# Patient Record
Sex: FEMALE | Race: WHITE | NOT HISPANIC OR LATINO | Employment: OTHER | URBAN - METROPOLITAN AREA
[De-identification: names, ages, dates, MRNs, and addresses within clinical notes are randomized per-mention and may not be internally consistent; named-entity substitution may affect disease eponyms.]

---

## 2019-10-23 ENCOUNTER — TELEPHONE (OUTPATIENT)
Dept: PRIMARY CARE CLINIC | Facility: CLINIC | Age: 69
End: 2019-10-23

## 2019-10-23 DIAGNOSIS — M65.30 TRIGGER FINGER, UNSPECIFIED FINGER, UNSPECIFIED LATERALITY: Primary | ICD-10-CM

## 2019-10-23 NOTE — TELEPHONE ENCOUNTER
"----- Message from Yasmeen Reed sent at 10/23/2019  2:58 PM CDT -----  Contact: Patient   Patient said she has been having trouble with her hand for the past two months. She thinks she has a "trigger" finger. She wants to get a referral and wanted to know if she needs an appt to get one.  "

## 2019-10-24 NOTE — TELEPHONE ENCOUNTER
She does not need an apt to get  the referral but she does need an apt at some point since we have not seen her in over 1 year.

## 2020-07-22 ENCOUNTER — TELEPHONE (OUTPATIENT)
Dept: OBSTETRICS AND GYNECOLOGY | Facility: CLINIC | Age: 70
End: 2020-07-22

## 2020-07-22 ENCOUNTER — PATIENT MESSAGE (OUTPATIENT)
Dept: OBSTETRICS AND GYNECOLOGY | Facility: CLINIC | Age: 70
End: 2020-07-22

## 2020-07-22 NOTE — TELEPHONE ENCOUNTER
----- Message from Batsheva Munoz MD sent at 7/22/2020 12:27 PM CDT -----  Please notify patient of normal result.

## 2020-07-22 NOTE — TELEPHONE ENCOUNTER
----- Message from Ashley Mathis sent at 7/22/2020 10:30 AM CDT -----  Ms. Darnell ([facility] ) called in regards to get an code for the patient order , patient is there now , please call back at [phone].  Called nurse station no answer .            Thanks,  Ashley Mathis

## 2020-09-15 ENCOUNTER — OFFICE VISIT (OUTPATIENT)
Dept: PRIMARY CARE CLINIC | Facility: CLINIC | Age: 70
End: 2020-09-15
Payer: COMMERCIAL

## 2020-09-15 VITALS
WEIGHT: 156 LBS | TEMPERATURE: 98 F | RESPIRATION RATE: 18 BRPM | DIASTOLIC BLOOD PRESSURE: 71 MMHG | HEIGHT: 65 IN | HEART RATE: 79 BPM | BODY MASS INDEX: 25.99 KG/M2 | SYSTOLIC BLOOD PRESSURE: 139 MMHG | OXYGEN SATURATION: 98 %

## 2020-09-15 DIAGNOSIS — T67.1XXA HEAT SYNCOPE, INITIAL ENCOUNTER: Primary | ICD-10-CM

## 2020-09-15 DIAGNOSIS — E78.00 ELEVATED LDL CHOLESTEROL LEVEL: ICD-10-CM

## 2020-09-15 DIAGNOSIS — R00.2 PALPITATION: ICD-10-CM

## 2020-09-15 DIAGNOSIS — R11.2 NAUSEA AND VOMITING, INTRACTABILITY OF VOMITING NOT SPECIFIED, UNSPECIFIED VOMITING TYPE: ICD-10-CM

## 2020-09-15 DIAGNOSIS — I83.811 VARICOSE VEINS OF RIGHT LOWER EXTREMITY WITH PAIN: ICD-10-CM

## 2020-09-15 PROCEDURE — 99203 OFFICE O/P NEW LOW 30 MIN: CPT | Mod: S$GLB,,, | Performed by: FAMILY MEDICINE

## 2020-09-15 PROCEDURE — 99203 PR OFFICE/OUTPT VISIT, NEW, LEVL III, 30-44 MIN: ICD-10-PCS | Mod: S$GLB,,, | Performed by: FAMILY MEDICINE

## 2020-09-15 PROCEDURE — 1159F PR MEDICATION LIST DOCUMENTED IN MEDICAL RECORD: ICD-10-PCS | Mod: S$GLB,,, | Performed by: FAMILY MEDICINE

## 2020-09-15 PROCEDURE — 3008F BODY MASS INDEX DOCD: CPT | Mod: CPTII,S$GLB,, | Performed by: FAMILY MEDICINE

## 2020-09-15 PROCEDURE — 3008F PR BODY MASS INDEX (BMI) DOCUMENTED: ICD-10-PCS | Mod: CPTII,S$GLB,, | Performed by: FAMILY MEDICINE

## 2020-09-15 PROCEDURE — 1159F MED LIST DOCD IN RCRD: CPT | Mod: S$GLB,,, | Performed by: FAMILY MEDICINE
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
## 2020-11-25 ENCOUNTER — TELEPHONE (OUTPATIENT)
Dept: PRIMARY CARE CLINIC | Facility: CLINIC | Age: 70
End: 2020-11-25

## 2020-11-25 DIAGNOSIS — I83.811 VARICOSE VEINS OF RIGHT LOWER EXTREMITY WITH PAIN: Primary | ICD-10-CM

## 2021-01-06 ENCOUNTER — OFFICE VISIT (OUTPATIENT)
Dept: OBSTETRICS AND GYNECOLOGY | Facility: CLINIC | Age: 71
End: 2021-01-06
Payer: MEDICARE

## 2021-01-06 VITALS
BODY MASS INDEX: 25.85 KG/M2 | DIASTOLIC BLOOD PRESSURE: 80 MMHG | HEIGHT: 65 IN | SYSTOLIC BLOOD PRESSURE: 120 MMHG | WEIGHT: 155.19 LBS | HEART RATE: 70 BPM

## 2021-01-06 DIAGNOSIS — Z01.419 ENCOUNTER FOR ROUTINE GYNECOLOGIC EXAMINATION IN MEDICARE PATIENT: Primary | ICD-10-CM

## 2021-01-06 DIAGNOSIS — G47.00 INSOMNIA, UNSPECIFIED TYPE: ICD-10-CM

## 2021-01-06 DIAGNOSIS — Z12.31 SCREENING MAMMOGRAM, ENCOUNTER FOR: ICD-10-CM

## 2021-01-06 PROCEDURE — G0101 CA SCREEN;PELVIC/BREAST EXAM: HCPCS | Mod: S$GLB,,, | Performed by: OBSTETRICS & GYNECOLOGY

## 2021-01-06 PROCEDURE — 1101F PR PT FALLS ASSESS DOC 0-1 FALLS W/OUT INJ PAST YR: ICD-10-PCS | Mod: CPTII,S$GLB,, | Performed by: OBSTETRICS & GYNECOLOGY

## 2021-01-06 PROCEDURE — 1126F AMNT PAIN NOTED NONE PRSNT: CPT | Mod: S$GLB,,, | Performed by: OBSTETRICS & GYNECOLOGY

## 2021-01-06 PROCEDURE — 1126F PR PAIN SEVERITY QUANTIFIED, NO PAIN PRESENT: ICD-10-PCS | Mod: S$GLB,,, | Performed by: OBSTETRICS & GYNECOLOGY

## 2021-01-06 PROCEDURE — 3008F BODY MASS INDEX DOCD: CPT | Mod: CPTII,S$GLB,, | Performed by: OBSTETRICS & GYNECOLOGY

## 2021-01-06 PROCEDURE — 3288F FALL RISK ASSESSMENT DOCD: CPT | Mod: CPTII,S$GLB,, | Performed by: OBSTETRICS & GYNECOLOGY

## 2021-01-06 PROCEDURE — G0101 PR CA SCREEN;PELVIC/BREAST EXAM: ICD-10-PCS | Mod: S$GLB,,, | Performed by: OBSTETRICS & GYNECOLOGY

## 2021-01-06 PROCEDURE — 1101F PT FALLS ASSESS-DOCD LE1/YR: CPT | Mod: CPTII,S$GLB,, | Performed by: OBSTETRICS & GYNECOLOGY

## 2021-01-06 PROCEDURE — 3008F PR BODY MASS INDEX (BMI) DOCUMENTED: ICD-10-PCS | Mod: CPTII,S$GLB,, | Performed by: OBSTETRICS & GYNECOLOGY

## 2021-01-06 PROCEDURE — 3288F PR FALLS RISK ASSESSMENT DOCUMENTED: ICD-10-PCS | Mod: CPTII,S$GLB,, | Performed by: OBSTETRICS & GYNECOLOGY

## 2021-01-06 RX ORDER — TRAZODONE HYDROCHLORIDE 50 MG/1
50 TABLET ORAL NIGHTLY
Qty: 30 TABLET | Refills: 11 | Status: SHIPPED | OUTPATIENT
Start: 2021-01-06 | End: 2022-01-13 | Stop reason: SDUPTHER

## 2021-01-06 RX ORDER — MULTIVIT WITH MINERALS/HERBS
1 TABLET ORAL DAILY
COMMUNITY

## 2021-03-17 NOTE — TELEPHONE ENCOUNTER
----- Message from Any Mckeon sent at 11/25/2020  1:35 PM CST -----  Please call nurse/Rosemary Dr Pillai @ [phone] regarding pt referral, need more records     left upper arm

## 2021-09-01 ENCOUNTER — TELEPHONE (OUTPATIENT)
Dept: OBSTETRICS AND GYNECOLOGY | Facility: CLINIC | Age: 71
End: 2021-09-01

## 2022-01-13 DIAGNOSIS — G47.00 INSOMNIA, UNSPECIFIED TYPE: ICD-10-CM

## 2022-01-13 RX ORDER — TRAZODONE HYDROCHLORIDE 50 MG/1
50 TABLET ORAL NIGHTLY
Qty: 30 TABLET | Refills: 5 | Status: SHIPPED | OUTPATIENT
Start: 2022-01-13 | End: 2023-03-27 | Stop reason: SDUPTHER

## 2022-07-01 ENCOUNTER — TELEPHONE (OUTPATIENT)
Dept: OBSTETRICS AND GYNECOLOGY | Facility: CLINIC | Age: 72
End: 2022-07-01
Payer: MEDICARE

## 2022-07-01 DIAGNOSIS — N30.90 CYSTITIS: Primary | ICD-10-CM

## 2022-07-01 DIAGNOSIS — R39.89 BLADDER PAIN: ICD-10-CM

## 2022-07-01 RX ORDER — SULFAMETHOXAZOLE AND TRIMETHOPRIM 800; 160 MG/1; MG/1
1 TABLET ORAL 2 TIMES DAILY
Qty: 14 TABLET | Refills: 0 | Status: SHIPPED | OUTPATIENT
Start: 2022-07-01 | End: 2022-08-22

## 2022-07-01 RX ORDER — TRAMADOL HYDROCHLORIDE 50 MG/1
50 TABLET ORAL EVERY 6 HOURS PRN
Qty: 10 TABLET | Refills: 0 | Status: SHIPPED | OUTPATIENT
Start: 2022-07-01 | End: 2022-08-22

## 2022-07-01 RX ORDER — PHENAZOPYRIDINE HYDROCHLORIDE 200 MG/1
200 TABLET, FILM COATED ORAL 3 TIMES DAILY PRN
Qty: 6 TABLET | Refills: 0 | Status: SHIPPED | OUTPATIENT
Start: 2022-07-01 | End: 2022-08-22

## 2022-07-01 NOTE — TELEPHONE ENCOUNTER
----- Message from Mariajose Duong sent at 7/1/2022  8:05 AM CDT -----  Regarding: Bladder Infection  Contact: patient  Per phone call with patient, she stated that she has a bladder infection and would like for the doctor to call her out something to the pharmacy and for the nurse to give her a call.  Please return call at [phone] (home).    Thanks,  SJ

## 2022-07-01 NOTE — TELEPHONE ENCOUNTER
Patient is Burning, Hurting with urinating and cant sit down since 4AM this moring. Please call something out ASAP

## 2022-07-24 NOTE — PROGRESS NOTES
Chief Complaint: episodes of bladder pain and incontinence      HPI:      Claribel Wnag is a 72 y.o. female  who presents complaining of an episode of urinary retention that was followed by severe pain and then incontinence.  She notes there was urinary odor before the antibiotics were called in the first week of July.  Better now but still with episodes of incontinence.  No LMP recorded. Patient is postmenopausal.    History reviewed. No pertinent past medical history.   Past Surgical History:   Procedure Laterality Date    BILATERAL TUBAL LIGATION      BUNIONECTOMY      CATARACT EXTRACTION, BILATERAL      COLONOSCOPY  2018    repeat in 5 years due to polyps    TONSILLECTOMY AND ADENOIDECTOMY      VEIN SURGERY      x 2      Social History     Tobacco Use    Smoking status: Never Smoker    Smokeless tobacco: Never Used   Substance Use Topics    Alcohol use: Yes    Drug use: Never      Current Outpatient Medications on File Prior to Visit   Medication Sig Dispense Refill    BIOTIN ORAL Take by mouth once daily.      CALCIUM CITRATE-VITAMIN D3 ORAL Citracal plus D   1 bid      traZODone (DESYREL) 50 MG tablet Take 1 tablet (50 mg total) by mouth every evening. 30 tablet 5    b complex vitamins tablet Take 1 tablet by mouth once daily.      phenazopyridine (PYRIDIUM) 200 MG tablet Take 1 tablet (200 mg total) by mouth 3 (three) times daily as needed for Pain. (Patient not taking: Reported on 2022) 6 tablet 0    sulfamethoxazole-trimethoprim 800-160mg (BACTRIM DS) 800-160 mg Tab Take 1 tablet by mouth 2 (two) times daily. (Patient not taking: Reported on 2022) 14 tablet 0    traMADoL (ULTRAM) 50 mg tablet Take 1 tablet (50 mg total) by mouth every 6 (six) hours as needed for Pain. (Patient not taking: Reported on 2022) 10 tablet 0     No current facility-administered medications on file prior to visit.      Review of patient's allergies indicates:   Allergen Reactions     "Penicillins           ROS:     GENERAL: Denies weight gain or weight loss. Feeling well overall.   SKIN: Denies rash or lesions.   NODES: Denies enlarged lymph nodes.   CARDIOVASCULAR: Denies palpitations or chest pain.   ABDOMEN: Denies abdominal pain, constipation, diarrhea, nausea, vomiting or rectal bleeding.   URINARY: Denies frequency, dysuria, hematuria, or burning on urination.  REPRODUCTIVE: See HPI.   BREASTS: Denies pain, lumps, or nipple discharge.   HEMATOLOGIC: Denies easy bruisability or excessive bleeding with the exception of menstrual cycles.  PSYCHIATRIC: Denies depression, anxiety or mood swings.   Physical Exam:      BP (!) 142/77   Ht 5' 5" (1.651 m)   Wt 71.1 kg (156 lb 12.8 oz)   BMI 26.09 kg/m²   Body mass index is 26.09 kg/m².   BREASTS: no adenopathy, masses, nipple discharge or skin changes  ABDOMEN: Soft.  No tenderness or masses. No hepatoslenomegaly. No hernias.   PELVIC: Normal external genitalia without lesions.  Normal hair distribution.  Adequate perineal body, normal urethral meatus.  Urethra and bladder without tenderness or masses. Vagina moist and well rugated without lesions or discharge.  Cervix pink, without lesions, discharge or tenderness. Cystocele to introitus.   Bimanual exam shows uterus to be normal size, regular, mobile and nontender. Uterine descenses to lower 1/2 of vagina.    Adnexa without masses or tenderness.        Assessment/Plan:     Bladder pain  -     Urinalysis, Reflex to Urine Culture Urine, Clean Catch; Future; Expected date: 07/25/2022    Screening mammogram, encounter for  -     Mammo Digital Screening Bilat w/ Edmund; Future    Uterine prolapse    Discussed evaluation with Dr. Norman So for surgical repair      "

## 2022-07-25 ENCOUNTER — OFFICE VISIT (OUTPATIENT)
Dept: OBSTETRICS AND GYNECOLOGY | Facility: CLINIC | Age: 72
End: 2022-07-25
Payer: MEDICARE

## 2022-07-25 VITALS
SYSTOLIC BLOOD PRESSURE: 142 MMHG | HEIGHT: 65 IN | BODY MASS INDEX: 26.13 KG/M2 | DIASTOLIC BLOOD PRESSURE: 77 MMHG | WEIGHT: 156.81 LBS

## 2022-07-25 DIAGNOSIS — N81.4 UTERINE PROLAPSE: ICD-10-CM

## 2022-07-25 DIAGNOSIS — R39.89 BLADDER PAIN: Primary | ICD-10-CM

## 2022-07-25 DIAGNOSIS — Z12.31 SCREENING MAMMOGRAM, ENCOUNTER FOR: ICD-10-CM

## 2022-07-25 PROCEDURE — 99214 PR OFFICE/OUTPT VISIT, EST, LEVL IV, 30-39 MIN: ICD-10-PCS | Mod: S$GLB,,, | Performed by: OBSTETRICS & GYNECOLOGY

## 2022-07-25 PROCEDURE — 99214 OFFICE O/P EST MOD 30 MIN: CPT | Mod: S$GLB,,, | Performed by: OBSTETRICS & GYNECOLOGY

## 2022-08-11 ENCOUNTER — TELEPHONE (OUTPATIENT)
Dept: OBSTETRICS AND GYNECOLOGY | Facility: CLINIC | Age: 72
End: 2022-08-11
Payer: MEDICARE

## 2022-08-11 NOTE — TELEPHONE ENCOUNTER
Pt. Needs latest mammo to bring to Dr. So with her.  Mammo result printed for pt. To p/u.  Pt. Verbalized understanding.

## 2022-08-11 NOTE — TELEPHONE ENCOUNTER
----- Message from Kerry Hampton sent at 8/11/2022  2:20 PM CDT -----  Contact: Pt  .Type:  Test Results    Who Called:  Claribel   Name of Test (Lab/Mammo/Etc):  mammo   Date of Test:    Ordering Provider:  Alexander    Where the test was performed:     Would the patient rather a call back or a response via [facility]?  callback  Best Call Back Number:  [phone] (home)         Additional Information:   pt would like to   report 08/12/2022

## 2022-08-12 ENCOUNTER — TELEPHONE (OUTPATIENT)
Dept: OBSTETRICS AND GYNECOLOGY | Facility: CLINIC | Age: 72
End: 2022-08-12
Payer: MEDICARE

## 2022-08-22 ENCOUNTER — TELEPHONE (OUTPATIENT)
Dept: OBSTETRICS AND GYNECOLOGY | Facility: CLINIC | Age: 72
End: 2022-08-22
Payer: MEDICARE

## 2022-08-22 DIAGNOSIS — N85.2 ENLARGED UTERUS: Primary | ICD-10-CM

## 2022-08-22 DIAGNOSIS — N81.4 UTERINE PROLAPSE: ICD-10-CM

## 2022-08-22 NOTE — TELEPHONE ENCOUNTER
Pt. Has order for US, will need a Hyst.  Needs TransVaginal and Transabdominal Pelvic US per Dr. Norman So.  Can she have it done here?

## 2022-08-22 NOTE — TELEPHONE ENCOUNTER
Please notify pt that she can have US here and we can send result to him. Please schedule. Order entered.

## 2022-08-22 NOTE — TELEPHONE ENCOUNTER
----- Message from Kerry Memo sent at 8/22/2022  3:27 PM CDT -----  Contact: PT  .Type:  Patient Returning Call    Who Called: Claribel     Does the patient know what this is regarding?: recvd u/s lower abdominal/ pelvic area    Would the patient rather a call back or a response via [facility]?  Callback   Best Call Back Number:  [phone] (home)      Additional Information:  ordered by Dr Norman Hernandez

## 2022-08-25 ENCOUNTER — PROCEDURE VISIT (OUTPATIENT)
Dept: OBSTETRICS AND GYNECOLOGY | Facility: CLINIC | Age: 72
End: 2022-08-25
Payer: MEDICARE

## 2022-08-25 DIAGNOSIS — N85.2 ENLARGED UTERUS: ICD-10-CM

## 2022-08-25 DIAGNOSIS — D39.11 NEOPLASM OF UNCERTAIN BEHAVIOR OF RIGHT OVARY: Primary | ICD-10-CM

## 2022-08-25 LAB — CANCER AG125 SERPL-ACNC: 25.7 U/ML (ref 0–38.1)

## 2022-08-25 PROCEDURE — 76856 US OB/GYN PROCEDURE (VIEWPOINT): ICD-10-PCS | Mod: S$GLB,,, | Performed by: OBSTETRICS & GYNECOLOGY

## 2022-08-25 PROCEDURE — 76856 US EXAM PELVIC COMPLETE: CPT | Mod: S$GLB,,, | Performed by: OBSTETRICS & GYNECOLOGY

## 2022-09-01 ENCOUNTER — TELEPHONE (OUTPATIENT)
Dept: OBSTETRICS AND GYNECOLOGY | Facility: CLINIC | Age: 72
End: 2022-09-01
Payer: MEDICARE

## 2022-09-01 NOTE — TELEPHONE ENCOUNTER
Pt. Has orders for EKG and CXR from Dr. Norman So and wants to know where to go to do that?  Dr. Munoz was consulted and advised pt. To go to nearest hospital admitting with the orders.  Pt. Verbalized understanding.

## 2022-09-01 NOTE — TELEPHONE ENCOUNTER
----- Message from Donovan Cuevas sent at 9/1/2022  2:49 PM CDT -----  Contact: self  Patient called and asked for a call back regarding test she need done.Please call [phone]

## 2022-11-17 ENCOUNTER — TELEPHONE (OUTPATIENT)
Dept: OBSTETRICS AND GYNECOLOGY | Facility: CLINIC | Age: 72
End: 2022-11-17
Payer: MEDICARE

## 2022-11-17 NOTE — TELEPHONE ENCOUNTER
----- Message from Nivia Naylor MA sent at 11/17/2022  1:54 PM CST -----  Contact: andrzej francis Daughter    ----- Message -----  From: Jillian Ballard  Sent: 11/17/2022   1:50 PM CST  To: Alexander Herman Staff    andrzej Francis Daughter of Claribel Wang called let Dr Munoz know her mom is out of the hospital in [place] and was told by the doctor that did the surgery that she needs her Cath removed Monday can not be no later       call back at [phone]

## 2022-11-21 ENCOUNTER — OFFICE VISIT (OUTPATIENT)
Dept: OBSTETRICS AND GYNECOLOGY | Facility: CLINIC | Age: 72
End: 2022-11-21
Payer: MEDICARE

## 2022-11-21 VITALS
HEIGHT: 65 IN | BODY MASS INDEX: 26.08 KG/M2 | SYSTOLIC BLOOD PRESSURE: 130 MMHG | WEIGHT: 156.5 LBS | DIASTOLIC BLOOD PRESSURE: 80 MMHG

## 2022-11-21 DIAGNOSIS — Z46.6 ENCOUNTER FOR FOLEY CATHETER REMOVAL: Primary | ICD-10-CM

## 2022-11-21 PROCEDURE — 99212 PR OFFICE/OUTPT VISIT, EST, LEVL II, 10-19 MIN: ICD-10-PCS | Mod: S$GLB,,, | Performed by: OBSTETRICS & GYNECOLOGY

## 2022-11-21 PROCEDURE — 99212 OFFICE O/P EST SF 10 MIN: CPT | Mod: S$GLB,,, | Performed by: OBSTETRICS & GYNECOLOGY

## 2022-11-21 RX ORDER — TRAMADOL HYDROCHLORIDE 50 MG/1
50 TABLET ORAL EVERY 6 HOURS PRN
Qty: 20 TABLET | Refills: 0 | Status: SHIPPED | OUTPATIENT
Start: 2022-11-21 | End: 2023-09-13

## 2022-11-21 RX ORDER — TRAMADOL HYDROCHLORIDE 50 MG/1
TABLET ORAL
COMMUNITY
Start: 2022-11-16 | End: 2022-11-21 | Stop reason: SDUPTHER

## 2022-11-21 NOTE — PROGRESS NOTES
"  Chief Complaint: ***     HPI:      Claribel Wang is a 72 y.o. female  who presents complaining of ***.  No LMP recorded. Patient is postmenopausal.    History reviewed. No pertinent past medical history.   Past Surgical History:   Procedure Laterality Date    BILATERAL TUBAL LIGATION      bladder suspension      BUNIONECTOMY      CATARACT EXTRACTION, BILATERAL  2013    COLONOSCOPY  2018    repeat in 5 years due to polyps    HYSTERECTOMY      TONSILLECTOMY AND ADENOIDECTOMY      VEIN SURGERY      x 2      Social History     Tobacco Use    Smoking status: Never     Passive exposure: Never    Smokeless tobacco: Never   Substance Use Topics    Alcohol use: Yes    Drug use: Never      Current Outpatient Medications on File Prior to Visit   Medication Sig Dispense Refill    b complex vitamins tablet Take 1 tablet by mouth once daily.      BIOTIN ORAL Take by mouth once daily.      CALCIUM CITRATE-VITAMIN D3 ORAL Citracal plus D   1 bid      traMADoL (ULTRAM) 50 mg tablet       traZODone (DESYREL) 50 MG tablet Take 1 tablet (50 mg total) by mouth every evening. 30 tablet 5     No current facility-administered medications on file prior to visit.      Review of patient's allergies indicates:   Allergen Reactions    Penicillins           ROS:     GENERAL: Denies weight gain or weight loss. Feeling well overall.   SKIN: Denies rash or lesions.   NODES: Denies enlarged lymph nodes.   CARDIOVASCULAR: Denies palpitations or chest pain.   ABDOMEN: Denies abdominal pain, constipation, diarrhea, nausea, vomiting or rectal bleeding.   URINARY: Denies frequency, dysuria, hematuria, or burning on urination.  REPRODUCTIVE: See HPI.   BREASTS: Denies pain, lumps, or nipple discharge.   HEMATOLOGIC: Denies easy bruisability or excessive bleeding with the exception of menstrual cycles.  PSYCHIATRIC: Denies depression, anxiety or mood swings.   Physical Exam:      /80 (BP Location: Left arm)   Ht 5' 5" (1.651 m)   Wt " 71 kg (156 lb 8 oz)   BMI 26.04 kg/m²   Body mass index is 26.04 kg/m².     ABDOMEN: Soft.  No tenderness or masses. No hepatoslenomegaly. No hernias.   PELVIC: Normal external genitalia without lesions.  Normal hair distribution.  Adequate perineal body, normal urethral meatus.  Urethra and bladder without tenderness or masses. Vagina moist and well rugated without lesions or discharge.  Cervix pink, without lesions, discharge or tenderness.  No significant cystocele or rectocele.  Bimanual exam shows uterus to be normal size, regular, mobile and nontender.  Adnexa without masses or tenderness.        Results:      Wet prep: negative for trichomonas, yeast, or clue cells     Assessment/Plan:     There are no diagnoses linked to this encounter.      Counseling:       Use of the [facility] Patient Portal discussed and encouraged during today's visit.     No follow-ups on file.

## 2022-11-22 ENCOUNTER — TELEPHONE (OUTPATIENT)
Dept: OBSTETRICS AND GYNECOLOGY | Facility: CLINIC | Age: 72
End: 2022-11-22
Payer: MEDICARE

## 2022-11-22 NOTE — TELEPHONE ENCOUNTER
Pt calling regarding getting her staples out from surgery in [place].  She will call there and find out when they need to be removed and call us back

## 2022-11-22 NOTE — TELEPHONE ENCOUNTER
Notified pt her medication has been sent to pharmacy. Pt verbalized understanding.    Pt. States she is emptying her bladder normal and had a BM today and is feeling better.

## 2022-11-22 NOTE — TELEPHONE ENCOUNTER
----- Message from Donovan Cuevas sent at 11/22/2022  1:33 PM CST -----  Contact: Dr So  Please call Bethany at Dr So office regarding pt staples. Please call [phone] ext [phone]

## 2022-11-22 NOTE — TELEPHONE ENCOUNTER
Staples can be removed 10 days after surgery that was done on 11/16/2022.  Please put steristrips on incision after staple removal.    Will you be able to remove staples?    Please advise.

## 2022-11-28 ENCOUNTER — OFFICE VISIT (OUTPATIENT)
Dept: OBSTETRICS AND GYNECOLOGY | Facility: CLINIC | Age: 72
End: 2022-11-28
Payer: MEDICARE

## 2022-11-28 VITALS
BODY MASS INDEX: 26.08 KG/M2 | WEIGHT: 156.5 LBS | HEART RATE: 80 BPM | SYSTOLIC BLOOD PRESSURE: 130 MMHG | HEIGHT: 65 IN | DIASTOLIC BLOOD PRESSURE: 80 MMHG

## 2022-11-28 DIAGNOSIS — Z48.02 ENCOUNTER FOR STAPLE REMOVAL: Primary | ICD-10-CM

## 2022-11-28 PROCEDURE — 99213 OFFICE O/P EST LOW 20 MIN: CPT | Mod: S$GLB,,, | Performed by: OBSTETRICS & GYNECOLOGY

## 2022-11-28 PROCEDURE — 99213 PR OFFICE/OUTPT VISIT, EST, LEVL III, 20-29 MIN: ICD-10-PCS | Mod: S$GLB,,, | Performed by: OBSTETRICS & GYNECOLOGY

## 2022-11-29 NOTE — PROGRESS NOTES
Chief Complaint: needs staples removed     HPI:      Claribel Wang is a 72 y.o. female  who presents for staple removal after hysterectomy and vaginal repair with Dr. So.    History reviewed. No pertinent past medical history.   Past Surgical History:   Procedure Laterality Date    BILATERAL TUBAL LIGATION      bladder suspension      BUNIONECTOMY      CATARACT EXTRACTION, BILATERAL  2013    COLONOSCOPY  2018    repeat in 5 years due to polyps    HYSTERECTOMY      TONSILLECTOMY AND ADENOIDECTOMY      VEIN SURGERY      x 2      Social History     Tobacco Use    Smoking status: Never     Passive exposure: Never    Smokeless tobacco: Never   Substance Use Topics    Alcohol use: Yes    Drug use: Never      Current Outpatient Medications on File Prior to Visit   Medication Sig Dispense Refill    b complex vitamins tablet Take 1 tablet by mouth once daily.      BIOTIN ORAL Take by mouth once daily.      CALCIUM CITRATE-VITAMIN D3 ORAL Citracal plus D   1 bid      traMADoL (ULTRAM) 50 mg tablet Take 1 tablet (50 mg total) by mouth every 6 (six) hours as needed for Pain. 20 tablet 0    traZODone (DESYREL) 50 MG tablet Take 1 tablet (50 mg total) by mouth every evening. 30 tablet 5     No current facility-administered medications on file prior to visit.      Review of patient's allergies indicates:   Allergen Reactions    Penicillins           ROS:     GENERAL: Denies weight gain or weight loss. Feeling well overall.   SKIN: Denies rash or lesions.   NODES: Denies enlarged lymph nodes.   CARDIOVASCULAR: Denies palpitations or chest pain.   ABDOMEN: Denies abdominal pain, constipation, diarrhea, nausea, vomiting or rectal bleeding.   URINARY: Denies frequency, dysuria, hematuria, or burning on urination.  REPRODUCTIVE: See HPI.   BREASTS: Denies pain, lumps, or nipple discharge.   HEMATOLOGIC: Denies easy bruisability or excessive bleeding  PSYCHIATRIC: Denies depression, anxiety or mood swings.     Physical  "Exam:      /80 (BP Location: Left arm, Patient Position: Sitting, BP Method: Large (Manual))   Pulse 80   Ht 5' 5" (1.651 m)   Wt 71 kg (156 lb 8 oz)   BMI 26.04 kg/m²   Body mass index is 26.04 kg/m².     ABDOMEN: Soft.  No tenderness or masses. No hepatoslenomegaly. No hernias.   Incisions healing well.    Staples removed and steri strips applied    Assessment/Plan:     Encounter for staple removal      She will follow up with Dr. So as instructed    "

## 2023-01-20 ENCOUNTER — TELEPHONE (OUTPATIENT)
Dept: OBSTETRICS AND GYNECOLOGY | Facility: CLINIC | Age: 73
End: 2023-01-20
Payer: MEDICARE

## 2023-03-27 ENCOUNTER — TELEPHONE (OUTPATIENT)
Dept: OBSTETRICS AND GYNECOLOGY | Facility: CLINIC | Age: 73
End: 2023-03-27
Payer: MEDICARE

## 2023-03-27 DIAGNOSIS — G47.00 INSOMNIA, UNSPECIFIED TYPE: ICD-10-CM

## 2023-03-27 RX ORDER — TRAZODONE HYDROCHLORIDE 50 MG/1
50 TABLET ORAL NIGHTLY
Qty: 30 TABLET | Refills: 11 | Status: SHIPPED | OUTPATIENT
Start: 2023-03-27 | End: 2024-03-26

## 2023-03-27 NOTE — TELEPHONE ENCOUNTER
----- Message from Nivia Naylor MA sent at 3/27/2023  1:59 PM CDT -----  Contact: self    ----- Message -----  From: Ramila De Jesus  Sent: 3/27/2023   1:57 PM CDT  To: Alexander Herman Staff    Pt is having trouble sleeping and wants to know if you could call in the medication that was prescribed once before, pt is unsure of the medication name   [place] [place] [facility] [address] - [facility] - [place], [place] - [address]  [address] [place] [address]  Phone: [phone] Fax: [phone]

## 2023-07-10 ENCOUNTER — TELEPHONE (OUTPATIENT)
Dept: OBSTETRICS AND GYNECOLOGY | Facility: CLINIC | Age: 73
End: 2023-07-10
Payer: MEDICARE

## 2023-07-10 NOTE — TELEPHONE ENCOUNTER
I spoke with patient and let her know that Dr Rodriguez is not taking any new medicare at this time. She will call Dr Bonilla or Candi for appt.

## 2023-07-10 NOTE — TELEPHONE ENCOUNTER
----- Message from Glenna Sesay sent at 7/10/2023  3:21 PM CDT -----  Contact: Patient  Patient called to consult with nurse or staff regarding an appointment. She wanted to see if she can be seen by Dr Rodriguez. She is a 65 and older patient and would like a call back if possible. She can be reached at [phone]. Thanks/

## 2023-09-03 ENCOUNTER — TELEPHONE (OUTPATIENT)
Dept: GASTROENTEROLOGY | Facility: CLINIC | Age: 73
End: 2023-09-03
Payer: MEDICARE

## 2023-09-03 DIAGNOSIS — Z86.010 PERSONAL HISTORY OF COLONIC POLYPS: Primary | ICD-10-CM

## 2023-09-03 DIAGNOSIS — Z12.11 SCREENING FOR MALIGNANT NEOPLASM OF COLON: ICD-10-CM

## 2023-09-03 NOTE — TELEPHONE ENCOUNTER
Care Everywhere referral reviewed. gMed chart with history of colon polyps.     Okay to update Epic chart and evaluate for directly scheduled colonoscopy for history of colonic polyps.  NBP

## 2023-09-12 NOTE — TELEPHONE ENCOUNTER
Called pt to schedule colonoscopy. Pt stated now is not a good time to speak and would like for me to call back later. risa

## 2023-09-13 VITALS — WEIGHT: 143 LBS | BODY MASS INDEX: 24.41 KG/M2 | HEIGHT: 64 IN

## 2023-09-13 RX ORDER — PROGESTERONE 100 MG/1
CAPSULE ORAL
COMMUNITY
Start: 2023-08-16 | End: 2023-09-13

## 2023-09-13 RX ORDER — ESTRADIOL 0.1 MG/D
PATCH TRANSDERMAL
COMMUNITY
Start: 2023-08-16

## 2023-09-13 RX ORDER — SODIUM, POTASSIUM,MAG SULFATES 17.5-3.13G
SOLUTION, RECONSTITUTED, ORAL ORAL
Qty: 1 KIT | Refills: 0 | Status: SHIPPED | OUTPATIENT
Start: 2023-09-13

## 2023-09-13 RX ORDER — PROGESTERONE 100 MG/1
CAPSULE ORAL
COMMUNITY

## 2023-09-13 NOTE — TELEPHONE ENCOUNTER
Called pt and scheduled colonoscopy for 12/11/23 (Monday). Updated the medical chart and went over the prep instructions. Pt requested that I email them to [email or web address]. risa

## 2023-11-29 ENCOUNTER — TELEPHONE (OUTPATIENT)
Dept: GASTROENTEROLOGY | Facility: CLINIC | Age: 73
End: 2023-11-29
Payer: MEDICARE

## 2023-11-29 VITALS — BODY MASS INDEX: 24.41 KG/M2 | WEIGHT: 143 LBS | HEIGHT: 64 IN

## 2023-11-29 DIAGNOSIS — Z86.010 PERSONAL HISTORY OF COLONIC POLYPS: Primary | ICD-10-CM

## 2023-11-29 DIAGNOSIS — Z12.11 SCREENING FOR MALIGNANT NEOPLASM OF COLON: ICD-10-CM

## 2023-11-29 NOTE — TELEPHONE ENCOUNTER
[place] - Gastroenterology  [address] [place] [address]  Phone: [phone]  Fax: [phone]    History & Physical         Provider: Dr. Kathy Rogers    Patient Name: Claribel AVILA (age):1950  73 y.o.           Gender: female   Phone: [phone]     Referring Physician: No, Primary Doctor     Vital Signs:   Height - 5'4''  Weight - 143 LB   BMI -  24.55    Plan: Colonoscopy @ [facility]     Encounter Diagnoses   Name Primary?    Personal history of colonic polyps Yes    Screening for malignant neoplasm of colon            History:      Past Medical History:   Diagnosis Date    Asymptomatic varicose veins of unspecified lower extremity     Difficulty sleeping     Personal history of colonic polyps       Past Surgical History:   Procedure Laterality Date    BILATERAL TUBAL LIGATION      bladder suspension      BUNIONECTOMY      CATARACT EXTRACTION, BILATERAL      COLONOSCOPY W/ POLYPECTOMY  2018    HYSTERECTOMY  2022    TONSILLECTOMY AND ADENOIDECTOMY      VEIN SURGERY      x 2      Medication List with Changes/Refills   Current Medications    B COMPLEX VITAMINS TABLET    Take 1 tablet by mouth once daily.    BIOTIN ORAL    Take by mouth once daily.    CALCIUM CITRATE-VITAMIN D3 ORAL    Citracal plus D   1 bid    ESTRADIOL 0.1 MG/24 HR TD PTWK 0.1 MG/24 HR PTWK        PROGESTERONE (PROMETRIUM) 100 MG CAPSULE    Take 1 capsule every day by oral route for 90 days.    SODIUM,POTASSIUM,MAG SULFATES (SUPREP BOWEL PREP KIT) 17.5-3.13-1.6 GRAM SOLR    Take according to kit instructions but DO NOT eat breakfast the morning of procedure.    TRAZODONE (DESYREL) 50 MG TABLET    Take 1 tablet (50 mg total) by mouth every evening.      Review of patient's allergies indicates:   Allergen Reactions    Penicillins       Family History   Problem Relation Age of Onset    Coronary artery disease Mother          valve replacement/triple bypass    Osteoporosis Mother     Lung cancer Father     Colon cancer Neg Hx     Colon polyps Neg Hx     Crohn's disease Neg Hx     Liver cancer Neg Hx       Social History     Tobacco Use    Smoking status: Never     Passive exposure: Never    Smokeless tobacco: Never   Substance Use Topics    Alcohol use: Not Currently    Drug use: Never        Physical Examination:     General Appearance:___________________________  HEENT: _____________________________________  Abdomen:____________________________________  Heart:________________________________________  Lungs:_______________________________________  Extremities:___________________________________  Skin:_________________________________________  Endocrine:____________________________________  Genitourinary:_________________________________  Neurological:__________________________________      Patient has been evaluated immediately prior to sedation and is medically cleared for endoscopy with IVCS as an ASA class: ______      Physician Signature: _________________________       Date: ________  Time: ________

## 2023-12-08 NOTE — TELEPHONE ENCOUNTER
S/w pt and told her that I was calling as a courtesy regarding upcoming colonoscopy with NBP on 12/11/23 Monday and wanted to verify that she had her prep instructions and meds. Pt stated she had both. I also provided pre-registration number, as well as told her that [facility] will call today with her arrival time and the GI Lab is located on the third floor. risa

## 2023-12-11 ENCOUNTER — OUTSIDE PLACE OF SERVICE (OUTPATIENT)
Dept: GASTROENTEROLOGY | Facility: CLINIC | Age: 73
End: 2023-12-11
Payer: MEDICARE

## 2023-12-11 LAB — CRC RECOMMENDATION EXT: NORMAL

## 2023-12-11 PROCEDURE — 45385 COLONOSCOPY W/LESION REMOVAL: CPT | Mod: PT,,, | Performed by: INTERNAL MEDICINE

## 2023-12-11 PROCEDURE — 45385 PR COLONOSCOPY,REMV LESN,SNARE: ICD-10-PCS | Mod: PT,,, | Performed by: INTERNAL MEDICINE

## 2023-12-14 ENCOUNTER — TELEPHONE (OUTPATIENT)
Dept: GASTROENTEROLOGY | Facility: CLINIC | Age: 73
End: 2023-12-14
Payer: MEDICARE

## 2023-12-15 NOTE — TELEPHONE ENCOUNTER
1 TA, repeat colonoscopy w/agg prep (no seeds/nuts/supp the week prior) in 3 years.     Notify patient. Confirm recall tab in appointment desk is up-to-date (update if needed).  NBP

## 2023-12-21 ENCOUNTER — DOCUMENTATION ONLY (OUTPATIENT)
Dept: GASTROENTEROLOGY | Facility: CLINIC | Age: 73
End: 2023-12-21
Payer: MEDICARE

## 2024-10-24 ENCOUNTER — TELEPHONE (OUTPATIENT)
Dept: OBSTETRICS AND GYNECOLOGY | Facility: CLINIC | Age: 74
End: 2024-10-24
Payer: MEDICARE

## 2024-10-24 NOTE — TELEPHONE ENCOUNTER
----- Message from Ramila sent at 10/24/2024  3:17 PM CDT -----  Contact: self  Type:  Patient Returning Call    Who Called:Claribel Wang  Who Left Message for Patient:unsure  Does the patient know what this is regarding?:requesting medical records/ sent to [email or web address]   Would the patient rather a call back or a response via [facility]?   Best Call Back Number:[phone]  Additional Information: former addison jones

## 2024-10-24 NOTE — TELEPHONE ENCOUNTER
I spoke with patient regarding her records. She was wanting to know what sleep pill Dr Munoz gave her in 2022.